# Patient Record
Sex: FEMALE | Race: WHITE | NOT HISPANIC OR LATINO | Employment: FULL TIME | URBAN - METROPOLITAN AREA
[De-identification: names, ages, dates, MRNs, and addresses within clinical notes are randomized per-mention and may not be internally consistent; named-entity substitution may affect disease eponyms.]

---

## 2020-07-08 ENCOUNTER — HOSPITAL ENCOUNTER (OUTPATIENT)
Dept: URGENT CARE | Facility: CLINIC | Age: 35
Discharge: HOME | End: 2020-07-08

## 2020-07-12 LAB — SARS-COV-2 RNA SPEC QL NAA+PROBE: NOT DETECTED

## 2021-01-08 ENCOUNTER — HOSPITAL ENCOUNTER (OUTPATIENT)
Dept: OTHER | Facility: HOSPITAL | Age: 36
Discharge: HOME | End: 2021-01-08
Attending: INTERNAL MEDICINE

## 2021-02-01 ENCOUNTER — HOSPITAL ENCOUNTER (OUTPATIENT)
Dept: VACCINE CLINIC | Facility: HOSPITAL | Age: 36
Discharge: HOME | End: 2021-02-01
Attending: INTERNAL MEDICINE

## 2021-08-15 PROCEDURE — U0003 INFECTIOUS AGENT DETECTION BY NUCLEIC ACID (DNA OR RNA); SEVERE ACUTE RESPIRATORY SYNDROME CORONAVIRUS 2 (SARS-COV-2) (CORONAVIRUS DISEASE [COVID-19]), AMPLIFIED PROBE TECHNIQUE, MAKING USE OF HIGH THROUGHPUT TECHNOLOGIES AS DESCRIBED BY CMS-2020-01-R: HCPCS | Performed by: NURSE PRACTITIONER

## 2021-08-17 ENCOUNTER — TELEPHONE (OUTPATIENT)
Dept: URGENT CARE | Facility: CLINIC | Age: 36
End: 2021-08-17

## 2021-08-17 NOTE — TELEPHONE ENCOUNTER
Called patient and reviewed positive Covid test result.  Patient had seen via My Chart and notified her employer.  Since her symptoms started on 8/15/2021, recommend that she quarantine for 10 days starting on that day.  Patient voiced understanding and no further questions.

## 2023-03-06 PROCEDURE — 87480 CANDIDA DNA DIR PROBE: CPT | Performed by: NURSE PRACTITIONER

## 2023-03-06 PROCEDURE — 87660 TRICHOMONAS VAGIN DIR PROBE: CPT | Performed by: NURSE PRACTITIONER

## 2023-03-06 PROCEDURE — 87491 CHLMYD TRACH DNA AMP PROBE: CPT | Performed by: NURSE PRACTITIONER

## 2023-03-06 PROCEDURE — 87086 URINE CULTURE/COLONY COUNT: CPT | Performed by: NURSE PRACTITIONER

## 2023-03-06 PROCEDURE — 87510 GARDNER VAG DNA DIR PROBE: CPT | Performed by: NURSE PRACTITIONER

## 2023-03-06 PROCEDURE — 87591 N.GONORRHOEAE DNA AMP PROB: CPT | Performed by: NURSE PRACTITIONER

## 2023-03-07 ENCOUNTER — TELEPHONE (OUTPATIENT)
Dept: URGENT CARE | Facility: CLINIC | Age: 38
End: 2023-03-07
Payer: MEDICAID

## 2023-03-08 ENCOUNTER — TELEPHONE (OUTPATIENT)
Dept: URGENT CARE | Facility: CLINIC | Age: 38
End: 2023-03-08
Payer: MEDICAID

## 2023-03-08 NOTE — TELEPHONE ENCOUNTER
----- Message from ERNST Pandey sent at 3/7/2023  3:35 PM EST -----  Please call the patient regarding her abnormal result.    Please inform patient that she tested negative for chlamydia, gonorrhea, trichomonas, and yeast.  Please inform patient that she did test positive for bacterial vaginosis.  I did call and metronidazole to [facility] pharmacy.  She should take this medication twice a day for 7 days as indicated on the prescription label.  While she is taking this medication she needs to avoid all alcohol.  Patient should also continue to take the Bactrim DS that was prescribed at her visit yesterday.

## 2023-03-08 NOTE — TELEPHONE ENCOUNTER
----- Message from ERNST Schwab sent at 3/8/2023 10:27 AM EST -----  Please notify patient of urine culture showing normal urogenital jaylon.  This finding suggests colonization rather than acute UTI.  The patient also tested positive for bacterial vaginosis which could likely cause her symptoms.  The patient can stop the Bactrim at this time and follow-up with PCP or GYN if symptoms worsen or persist.

## 2023-03-09 ENCOUNTER — TELEPHONE (OUTPATIENT)
Dept: URGENT CARE | Facility: CLINIC | Age: 38
End: 2023-03-09
Payer: MEDICAID

## 2023-03-10 ENCOUNTER — TELEPHONE (OUTPATIENT)
Dept: URGENT CARE | Facility: CLINIC | Age: 38
End: 2023-03-10
Payer: MEDICAID

## 2023-07-20 ENCOUNTER — HOSPITAL ENCOUNTER (INPATIENT)
Facility: HOSPITAL | Age: 38
LOS: 3 days | Discharge: LEFT BEFORE DISCHARGE | DRG: 918 | End: 2023-07-23
Attending: EMERGENCY MEDICINE | Admitting: INTERNAL MEDICINE
Payer: MEDICAID

## 2023-07-20 DIAGNOSIS — T39.1X2A INTENTIONAL ACETAMINOPHEN OVERDOSE, INITIAL ENCOUNTER: Primary | ICD-10-CM

## 2023-07-20 LAB
ALBUMIN SERPL-MCNC: 4.7 G/DL (ref 3.5–5.2)
ALBUMIN/GLOB SERPL: 1.6 G/DL
ALP SERPL-CCNC: 65 U/L (ref 39–117)
ALT SERPL W P-5'-P-CCNC: 13 U/L (ref 1–33)
AMPHET+METHAMPHET UR QL: POSITIVE
ANION GAP SERPL CALCULATED.3IONS-SCNC: 22.3 MMOL/L (ref 5–15)
APAP SERPL-MCNC: 15.3 MCG/ML (ref 0–30)
APTT PPP: 25.4 SECONDS (ref 24.2–34.2)
AST SERPL-CCNC: 15 U/L (ref 1–32)
BARBITURATES UR QL SCN: NEGATIVE
BASOPHILS # BLD AUTO: 0.01 10*3/MM3 (ref 0–0.2)
BASOPHILS NFR BLD AUTO: 0.1 % (ref 0–1.5)
BENZODIAZ UR QL SCN: NEGATIVE
BILIRUB SERPL-MCNC: 0.3 MG/DL (ref 0–1.2)
BUN SERPL-MCNC: 20 MG/DL (ref 6–20)
BUN/CREAT SERPL: 15.4 (ref 7–25)
CALCIUM SPEC-SCNC: 9.6 MG/DL (ref 8.6–10.5)
CANNABINOIDS SERPL QL: POSITIVE
CHLORIDE SERPL-SCNC: 97 MMOL/L (ref 98–107)
CO2 SERPL-SCNC: 20.7 MMOL/L (ref 22–29)
COCAINE UR QL: NEGATIVE
CREAT SERPL-MCNC: 1.3 MG/DL (ref 0.57–1)
DEPRECATED RDW RBC AUTO: 39.1 FL (ref 37–54)
EGFRCR SERPLBLD CKD-EPI 2021: 54.8 ML/MIN/1.73
EOSINOPHIL # BLD AUTO: 0 10*3/MM3 (ref 0–0.4)
EOSINOPHIL NFR BLD AUTO: 0 % (ref 0.3–6.2)
ERYTHROCYTE [DISTWIDTH] IN BLOOD BY AUTOMATED COUNT: 12.1 % (ref 12.3–15.4)
ETHANOL BLD-MCNC: <10 MG/DL (ref 0–10)
ETHANOL UR QL: <0.01 %
FENTANYL UR-MCNC: NEGATIVE NG/ML
GLOBULIN UR ELPH-MCNC: 2.9 GM/DL
GLUCOSE SERPL-MCNC: 120 MG/DL (ref 65–99)
HCG INTACT+B SERPL-ACNC: <0.5 MIU/ML
HCT VFR BLD AUTO: 45.5 % (ref 34–46.6)
HGB BLD-MCNC: 15.3 G/DL (ref 12–15.9)
HOLD SPECIMEN: NORMAL
HOLD SPECIMEN: NORMAL
IMM GRANULOCYTES # BLD AUTO: 0.06 10*3/MM3 (ref 0–0.05)
IMM GRANULOCYTES NFR BLD AUTO: 0.4 % (ref 0–0.5)
INR PPP: 1.22 (ref 0.86–1.15)
LYMPHOCYTES # BLD AUTO: 1.48 10*3/MM3 (ref 0.7–3.1)
LYMPHOCYTES NFR BLD AUTO: 9.4 % (ref 19.6–45.3)
MCH RBC QN AUTO: 29.6 PG (ref 26.6–33)
MCHC RBC AUTO-ENTMCNC: 33.6 G/DL (ref 31.5–35.7)
MCV RBC AUTO: 88 FL (ref 79–97)
METHADONE UR QL SCN: NEGATIVE
MONOCYTES # BLD AUTO: 1.01 10*3/MM3 (ref 0.1–0.9)
MONOCYTES NFR BLD AUTO: 6.4 % (ref 5–12)
NEUTROPHILS NFR BLD AUTO: 13.2 10*3/MM3 (ref 1.7–7)
NEUTROPHILS NFR BLD AUTO: 83.7 % (ref 42.7–76)
NRBC BLD AUTO-RTO: 0 /100 WBC (ref 0–0.2)
OPIATES UR QL: NEGATIVE
OXYCODONE UR QL SCN: NEGATIVE
PLATELET # BLD AUTO: 406 10*3/MM3 (ref 140–450)
PMV BLD AUTO: 9.3 FL (ref 6–12)
POTASSIUM SERPL-SCNC: 3.4 MMOL/L (ref 3.5–5.2)
PROT SERPL-MCNC: 7.6 G/DL (ref 6–8.5)
PROTHROMBIN TIME: 15.5 SECONDS (ref 11.8–14.9)
RBC # BLD AUTO: 5.17 10*6/MM3 (ref 3.77–5.28)
SALICYLATES SERPL-MCNC: <0.3 MG/DL
SODIUM SERPL-SCNC: 140 MMOL/L (ref 136–145)
WBC NRBC COR # BLD: 15.76 10*3/MM3 (ref 3.4–10.8)
WHOLE BLOOD HOLD COAG: NORMAL
WHOLE BLOOD HOLD SPECIMEN: NORMAL

## 2023-07-20 PROCEDURE — 0 ACETYLCYSTEINE PER 100 MG: Performed by: EMERGENCY MEDICINE

## 2023-07-20 PROCEDURE — 80307 DRUG TEST PRSMV CHEM ANLYZR: CPT

## 2023-07-20 PROCEDURE — 93010 ELECTROCARDIOGRAM REPORT: CPT | Performed by: INTERNAL MEDICINE

## 2023-07-20 PROCEDURE — 85610 PROTHROMBIN TIME: CPT

## 2023-07-20 PROCEDURE — 84702 CHORIONIC GONADOTROPIN TEST: CPT | Performed by: EMERGENCY MEDICINE

## 2023-07-20 PROCEDURE — 93005 ELECTROCARDIOGRAM TRACING: CPT | Performed by: EMERGENCY MEDICINE

## 2023-07-20 PROCEDURE — 80143 DRUG ASSAY ACETAMINOPHEN: CPT

## 2023-07-20 PROCEDURE — 85025 COMPLETE CBC W/AUTO DIFF WBC: CPT

## 2023-07-20 PROCEDURE — 99285 EMERGENCY DEPT VISIT HI MDM: CPT

## 2023-07-20 PROCEDURE — 82077 ASSAY SPEC XCP UR&BREATH IA: CPT

## 2023-07-20 PROCEDURE — 80053 COMPREHEN METABOLIC PANEL: CPT

## 2023-07-20 PROCEDURE — 93005 ELECTROCARDIOGRAM TRACING: CPT

## 2023-07-20 PROCEDURE — 80179 DRUG ASSAY SALICYLATE: CPT

## 2023-07-20 PROCEDURE — 85730 THROMBOPLASTIN TIME PARTIAL: CPT | Performed by: EMERGENCY MEDICINE

## 2023-07-20 PROCEDURE — 36415 COLL VENOUS BLD VENIPUNCTURE: CPT

## 2023-07-20 RX ORDER — SODIUM CHLORIDE 0.9 % (FLUSH) 0.9 %
10 SYRINGE (ML) INJECTION AS NEEDED
Status: DISCONTINUED | OUTPATIENT
Start: 2023-07-20 | End: 2023-07-23 | Stop reason: HOSPADM

## 2023-07-20 RX ADMIN — ACETYLCYSTEINE 10240 MG: 6 INJECTION, SOLUTION INTRAVENOUS at 23:43

## 2023-07-21 PROBLEM — T50.901D: Status: ACTIVE | Noted: 2023-07-21

## 2023-07-21 LAB
ALBUMIN SERPL-MCNC: 4.7 G/DL (ref 3.5–5.2)
ALBUMIN/GLOB SERPL: 1.7 G/DL
ALP SERPL-CCNC: 66 U/L (ref 39–117)
ALT SERPL W P-5'-P-CCNC: 16 U/L (ref 1–33)
ALT SERPL W P-5'-P-CCNC: 16 U/L (ref 1–33)
ANION GAP SERPL CALCULATED.3IONS-SCNC: 13.9 MMOL/L (ref 5–15)
APAP SERPL-MCNC: <5 MCG/ML (ref 0–30)
AST SERPL-CCNC: 16 U/L (ref 1–32)
AST SERPL-CCNC: 16 U/L (ref 1–32)
BASOPHILS # BLD AUTO: 0.03 10*3/MM3 (ref 0–0.2)
BASOPHILS NFR BLD AUTO: 0.3 % (ref 0–1.5)
BILIRUB SERPL-MCNC: 0.2 MG/DL (ref 0–1.2)
BUN SERPL-MCNC: 19 MG/DL (ref 6–20)
BUN/CREAT SERPL: 19 (ref 7–25)
CALCIUM SPEC-SCNC: 10.3 MG/DL (ref 8.6–10.5)
CHLORIDE SERPL-SCNC: 97 MMOL/L (ref 98–107)
CO2 SERPL-SCNC: 30.1 MMOL/L (ref 22–29)
CREAT SERPL-MCNC: 1 MG/DL (ref 0.57–1)
DEPRECATED RDW RBC AUTO: 38.5 FL (ref 37–54)
EGFRCR SERPLBLD CKD-EPI 2021: 75 ML/MIN/1.73
EOSINOPHIL # BLD AUTO: 0.07 10*3/MM3 (ref 0–0.4)
EOSINOPHIL NFR BLD AUTO: 0.6 % (ref 0.3–6.2)
ERYTHROCYTE [DISTWIDTH] IN BLOOD BY AUTOMATED COUNT: 12.4 % (ref 12.3–15.4)
GLOBULIN UR ELPH-MCNC: 2.8 GM/DL
GLUCOSE SERPL-MCNC: 110 MG/DL (ref 65–99)
HCT VFR BLD AUTO: 45.5 % (ref 34–46.6)
HGB BLD-MCNC: 15.4 G/DL (ref 12–15.9)
IMM GRANULOCYTES # BLD AUTO: 0.02 10*3/MM3 (ref 0–0.05)
IMM GRANULOCYTES NFR BLD AUTO: 0.2 % (ref 0–0.5)
INR PPP: 1.13 (ref 0.86–1.15)
LYMPHOCYTES # BLD AUTO: 2.77 10*3/MM3 (ref 0.7–3.1)
LYMPHOCYTES NFR BLD AUTO: 23.9 % (ref 19.6–45.3)
MCH RBC QN AUTO: 28.8 PG (ref 26.6–33)
MCHC RBC AUTO-ENTMCNC: 33.8 G/DL (ref 31.5–35.7)
MCV RBC AUTO: 85.2 FL (ref 79–97)
MONOCYTES # BLD AUTO: 0.76 10*3/MM3 (ref 0.1–0.9)
MONOCYTES NFR BLD AUTO: 6.6 % (ref 5–12)
NEUTROPHILS NFR BLD AUTO: 68.4 % (ref 42.7–76)
NEUTROPHILS NFR BLD AUTO: 7.92 10*3/MM3 (ref 1.7–7)
NRBC BLD AUTO-RTO: 0 /100 WBC (ref 0–0.2)
PLATELET # BLD AUTO: 407 10*3/MM3 (ref 140–450)
PMV BLD AUTO: 9.5 FL (ref 6–12)
POTASSIUM SERPL-SCNC: 2.9 MMOL/L (ref 3.5–5.2)
PROT SERPL-MCNC: 7.5 G/DL (ref 6–8.5)
PROTHROMBIN TIME: 14.6 SECONDS (ref 11.8–14.9)
QT INTERVAL: 419 MS
RBC # BLD AUTO: 5.34 10*6/MM3 (ref 3.77–5.28)
SODIUM SERPL-SCNC: 141 MMOL/L (ref 136–145)
WBC NRBC COR # BLD: 11.57 10*3/MM3 (ref 3.4–10.8)

## 2023-07-21 PROCEDURE — 0 ACETYLCYSTEINE PER 100 MG: Performed by: EMERGENCY MEDICINE

## 2023-07-21 PROCEDURE — 85025 COMPLETE CBC W/AUTO DIFF WBC: CPT | Performed by: INTERNAL MEDICINE

## 2023-07-21 PROCEDURE — 80143 DRUG ASSAY ACETAMINOPHEN: CPT | Performed by: INTERNAL MEDICINE

## 2023-07-21 PROCEDURE — 85610 PROTHROMBIN TIME: CPT | Performed by: INTERNAL MEDICINE

## 2023-07-21 PROCEDURE — 80053 COMPREHEN METABOLIC PANEL: CPT | Performed by: INTERNAL MEDICINE

## 2023-07-21 RX ADMIN — ACETYLCYSTEINE 3420 MG: 6 INJECTION, SOLUTION INTRAVENOUS at 01:47

## 2023-07-21 RX ADMIN — ACETYLCYSTEINE 6820 MG: 6 INJECTION, SOLUTION INTRAVENOUS at 06:00

## 2023-07-21 NOTE — ED PROVIDER NOTES
Time: 11:02 PM EDT  Date of encounter:  7/20/2023  Independent Historian/Clinical History and Information was obtained by:   Patient    History is limited by: N/A    Chief Complaint: overdose      History of Present Illness:  Patient is a 38 y.o. year old female who presents to the emergency department for evaluation of overdose.  Patient reports that she took 2 bottles of Tylenol, 2 bottles of ibuprofen, 1 box of Tylenol Sinus.  This happened last night around 9 PM >24 hours ago.  She reports things have just been building up which caused her to do this.  She reports nausea and vomiting.  She states she had abdominal pain earlier that has since improved.    HPI    Patient Care Team  Primary Care Provider: Nelia Garcia PA-C    Past Medical History:     No Known Allergies  Past Medical History:   Diagnosis Date    Anxiety and depression     Tick bite     LEFT SIDE AT BRA LINE     Past Surgical History:   Procedure Laterality Date    SUPRAPUBIC TUBE REMOVAL       History reviewed. No pertinent family history.    Home Medications:  Prior to Admission medications    Medication Sig Start Date End Date Taking? Authorizing Provider   cetirizine (zyrTEC) 10 MG tablet Take 1 tablet by mouth Daily.    Emergency, Nurse Oh, RN   hydrOXYzine (ATARAX) 25 MG tablet  2/15/19   Emergency, Nurse Oh, RN        Social History:   Social History     Tobacco Use    Smoking status: Every Day     Packs/day: 1.00     Years: 25.00     Pack years: 25.00     Types: Cigarettes    Smokeless tobacco: Never   Vaping Use    Vaping Use: Never used   Substance Use Topics    Alcohol use: Yes     Comment: RARELY    Drug use: Defer         Review of Systems:  Review of Systems   Constitutional:  Negative for chills and fever.   HENT:  Negative for congestion, ear pain and sore throat.    Eyes:  Negative for pain.   Respiratory:  Negative for cough, chest tightness and shortness of breath.    Cardiovascular:  Negative for chest pain.  "  Gastrointestinal:  Positive for abdominal pain, nausea and vomiting. Negative for diarrhea.   Genitourinary:  Negative for flank pain and hematuria.   Musculoskeletal:  Negative for joint swelling.   Skin:  Negative for pallor.   Neurological:  Negative for seizures and headaches.   All other systems reviewed and are negative.     Physical Exam:  /93 (BP Location: Right arm, Patient Position: Sitting)   Pulse 61   Temp 98.2 °F (36.8 °C) (Oral)   Resp 20   Ht 175.3 cm (69\")   Wt 68.2 kg (150 lb 5.7 oz)   LMP 07/17/2023   SpO2 99%   BMI 22.20 kg/m²     Physical Exam  Constitutional:       Appearance: Normal appearance.   HENT:      Head: Normocephalic and atraumatic.      Nose: Nose normal.      Mouth/Throat:      Mouth: Mucous membranes are moist.   Eyes:      Extraocular Movements: Extraocular movements intact.      Conjunctiva/sclera: Conjunctivae normal.      Pupils: Pupils are equal, round, and reactive to light.   Cardiovascular:      Rate and Rhythm: Normal rate and regular rhythm.      Pulses: Normal pulses.      Heart sounds: Normal heart sounds.   Pulmonary:      Effort: Pulmonary effort is normal.      Breath sounds: Normal breath sounds.   Abdominal:      General: There is no distension.      Palpations: Abdomen is soft.      Tenderness: There is no abdominal tenderness.   Musculoskeletal:         General: Normal range of motion.      Cervical back: Normal range of motion.   Skin:     General: Skin is warm and dry.      Capillary Refill: Capillary refill takes less than 2 seconds.   Neurological:      General: No focal deficit present.      Mental Status: She is alert and oriented to person, place, and time. Mental status is at baseline.   Psychiatric:         Mood and Affect: Mood normal.         Behavior: Behavior normal.                Procedures:  Procedures      Medical Decision Making:      Comorbidities that affect care:    Anxiety, depression    External Notes reviewed:    Previous " Clinic Note: Patient seen at urgent care on 3/6/2023 for UTI      The following orders were placed and all results were independently analyzed by me:  Orders Placed This Encounter   Procedures    [place] Draw    Comprehensive Metabolic Panel    Acetaminophen Level    Ethanol    Salicylate Level    Urine Drug Screen - Urine, Clean Catch    CBC Auto Differential    Protime-INR    aPTT    hCG, Quantitative, Pregnancy    Acetaminophen Level    ALT    AST    NPO Diet NPO Type: Strict NPO    Continuous Pulse Oximetry    Vital Signs    Undress & Gown    RN To Release Lab Order for Acetaminophen Level, AST & ALT After Acetylcysteine Administration Complete    Call Results of Acetaminophen Level, ALT & AST to Provider    Psych / Access to See    Inpatient Hospitalist Consult    Oxygen Therapy- Nasal Cannula; Titrate 1-6 LPM Per SpO2; 90 - 95%    POC Glucose Once    ECG 12 Lead Drug Monitoring    Insert Peripheral IV    Suicide Precautions    CBC & Differential    Green Top (Gel)    Lavender Top    Gold Top - SST    Light Blue Top       Medications Given in the Emergency Department:  Medications   sodium chloride 0.9 % flush 10 mL (has no administration in time range)   acetylcysteine (ACETADOTE) 10,240 mg in dextrose (D5W) 5 % 200 mL infusion (has no administration in time range)     Followed by   acetylcysteine (ACETADOTE) 3,420 mg in dextrose (D5W) 5 % 500 mL infusion (has no administration in time range)     Followed by   acetylcysteine (ACETADOTE) 6,820 mg in dextrose (D5W) 5 % 1,000 mL infusion (has no administration in time range)        ED Course:    ED Course as of 07/20/23 2308   Thu Jul 20, 2023   2206 ECG 12 Lead Drug Monitoring  Sinus rhythm rate of 61.  No acute ST elevation.  Normal VA and QTc interval.  EKG interpreted by me. [LD]      ED Course User Index  [LD] Lindy De Guzman MD       Labs:    Lab Results (last 24 hours)       Procedure Component Value Units Date/Time    CBC & Differential [[id number]]   (Abnormal) Collected: 07/20/23 2113    Specimen: Blood Updated: 07/20/23 2147    Narrative:      The following orders were created for panel order CBC & Differential.  Procedure                               Abnormality         Status                     ---------                               -----------         ------                     CBC Auto Differential[[id number]]        Abnormal            Final result                 Please view results for these tests on the individual orders.    Comprehensive Metabolic Panel [[id number]]  (Abnormal) Collected: 07/20/23 2113    Specimen: Blood Updated: 07/20/23 2212     Glucose 120 mg/dL      BUN 20 mg/dL      Creatinine 1.30 mg/dL      Sodium 140 mmol/L      Potassium 3.4 mmol/L      Chloride 97 mmol/L      CO2 20.7 mmol/L      Calcium 9.6 mg/dL      Total Protein 7.6 g/dL      Albumin 4.7 g/dL      ALT (SGPT) 13 U/L      AST (SGOT) 15 U/L      Alkaline Phosphatase 65 U/L      Total Bilirubin 0.3 mg/dL      Globulin 2.9 gm/dL      A/G Ratio 1.6 g/dL      BUN/Creatinine Ratio 15.4     Anion Gap 22.3 mmol/L      eGFR 54.8 mL/min/1.73     Narrative:      GFR Normal >60  Chronic Kidney Disease <60  Kidney Failure <15      Acetaminophen Level [[id number]]  (Normal) Collected: 07/20/23 2113    Specimen: Blood Updated: 07/20/23 2212     Acetaminophen 15.3 mcg/mL     Ethanol [[id number]] Collected: 07/20/23 2113    Specimen: Blood Updated: 07/20/23 2212     Ethanol <10 mg/dL      Ethanol % <0.010 %     Narrative:      Ethanol (Plasma)  <10 Essentially Negative    Toxic Concentrations           mg/dL    Flushing, slowing of reflexes    Impaired visual activity         Depression of CNS              >100  Possible Coma                  >300       Salicylate Level [[id number]]  (Normal) Collected: 07/20/23 2113    Specimen: Blood Updated: 07/20/23 2212     Salicylate <0.3 mg/dL     CBC Auto Differential [[id number]]  (Abnormal) Collected: 07/20/23 2113    Specimen: Blood  Updated: 07/20/23 2147     WBC 15.76 10*3/mm3      RBC 5.17 10*6/mm3      Hemoglobin 15.3 g/dL      Hematocrit 45.5 %      MCV 88.0 fL      MCH 29.6 pg      MCHC 33.6 g/dL      RDW 12.1 %      RDW-SD 39.1 fl      MPV 9.3 fL      Platelets 406 10*3/mm3      Neutrophil % 83.7 %      Lymphocyte % 9.4 %      Monocyte % 6.4 %      Eosinophil % 0.0 %      Basophil % 0.1 %      Immature Grans % 0.4 %      Neutrophils, Absolute 13.20 10*3/mm3      Lymphocytes, Absolute 1.48 10*3/mm3      Monocytes, Absolute 1.01 10*3/mm3      Eosinophils, Absolute 0.00 10*3/mm3      Basophils, Absolute 0.01 10*3/mm3      Immature Grans, Absolute 0.06 10*3/mm3      nRBC 0.0 /100 WBC     Protime-INR [[id number]]  (Abnormal) Collected: 07/20/23 2113    Specimen: Blood Updated: 07/20/23 2155     Protime 15.5 Seconds      INR 1.22    Narrative:      Suggested Therapeutic Ranges For Oral Anticoagulant Therapy:  Level of Therapy                      INR Target Range  Standard Dose                            2.0-3.0  High Dose                                2.5-3.5  Patients not receiving anticoagulant  Therapy Normal Range                     0.86-1.15    aPTT [[id number]]  (Normal) Collected: 07/20/23 2113    Specimen: Blood Updated: 07/20/23 2204     PTT 25.4 seconds     hCG, Quantitative, Pregnancy [[id number]] Collected: 07/20/23 2113    Specimen: Blood Updated: 07/20/23 2213     HCG Quantitative <0.50 mIU/mL     Narrative:      HCG Ranges by Gestational Age    Females - non-pregnant premenopausal   </= 1mIU/mL HCG  Females - postmenopausal               </= 7mIU/mL HCG    3 Weeks       5.4   -      72 mIU/mL  4 Weeks      10.2   -     708 mIU/mL  5 Weeks       217   -   8,245 mIU/mL  6 Weeks       152   -  32,177 mIU/mL  7 Weeks     4,059   - 153,767 mIU/mL  8 Weeks    31,366   - 149,094 mIU/mL  9 Weeks    59,109   - 135,901 mIU/mL  10 Weeks   44,186   - 170,409 mIU/mL  12 Weeks   27,107   - 201,615 mIU/mL  14 Weeks   24,302   -  93,646  mIU/mL  15 Weeks   12,540   -  69,747 mIU/mL  16 Weeks    8,904   -  55,332 mIU/mL  17 Weeks    8,240   -  51,793 mIU/mL  18 Weeks    9,649   -  55,271 mIU/mL      Urine Drug Screen - Urine, Clean Catch [[id number]] Collected: 07/20/23 2255    Specimen: Urine, Clean Catch Updated: 07/20/23 2257             Imaging:    No Radiology Exams Resulted Within Past 24 Hours      Differential Diagnosis and Discussion:    Psychiatric: Differential diagnosis includes but is not limited to depression, psychosis, bipolar disorder, anxiety, manic episode, schizophrenia, and substance abuse.    All labs were reviewed and interpreted by me.  EKG was interpreted by me.    MDM  Number of Diagnoses or Management Options  Intentional acetaminophen overdose, initial encounter  Diagnosis management comments: Patient is afebrile nontoxic-appearing.  Vitals remarkable for tachycardia.  Heart rate improved.  Tylenol level was 15.3.  INR elevated to 1.2.  PTT within normal range.  LFTs are not elevated.  Patient was discussed with poison control who states anything over 17 hours greater than 15.3 is considered toxic patient will will need to be treated with NAC.  She will need a redraw of INR, LFTs, Tylenol level 2 hours before completion of last dose of NAC if this improves she can be medically cleared.  I discussed patient with hospitalist and she will admit for further care.       Amount and/or Complexity of Data Reviewed  Clinical lab tests: reviewed  Tests in the radiology section of CPT®: reviewed  Review and summarize past medical records: yes  Independent visualization of images, tracings, or specimens: yes    Risk of Complications, Morbidity, and/or Mortality  Presenting problems: moderate  Management options: moderate         Critical Care Note: Total Critical Care time of 45 minutes. Total critical care time documented does not include time spent on separately billed procedures for services of nurses or physician assistants. I  personally saw and examined the patient. I have reviewed all diagnostic interpretations and treatment plans as written. I was present for the key portions of any procedures performed and the inclusive time noted in any critical care statement. Critical care time includes patient management by me, time spent at the patients bedside,  time to review lab and imaging results, discussing patient care, documentation in the medical record, and time spent with family or caregiver.    Patient Care Considerations:          Consultants/Shared Management Plan:    Hospitalist: I have discussed the case with Dr Morrissey who agrees to accept the patient for admission.    Social Determinants of Health:    Patient is independent, reliable, and has access to care.       Disposition and Care Coordination:    Admit:   Through independent evaluation of the patient's history, physical, and imperical data, the patient meets criteria for observation/admission to the hospital.        Final diagnoses:   Intentional acetaminophen overdose, initial encounter        ED Disposition       ED Disposition   Decision to Admit    Condition   --    Comment   --               This medical record created using voice recognition software.             Lindy De Guzman MD  07/20/23 1015       Lindy De Guzman MD  07/20/23 9381

## 2023-07-21 NOTE — PLAN OF CARE
Goal Outcome Evaluation:  Plan of Care Reviewed With: patient        Progress: no change  Outcome Evaluation: patient is alert and oriented this shift. no c/o pain. patient remains in close watch but states she has no suicidal ideations at this time. acetylcysteine drip currently infusing. no other changes at this time.

## 2023-07-21 NOTE — H&P
[facility]   HISTORY AND PHYSICAL    Patient Name: Concha Davison  : 1985  MRN: [id number]  Primary Care Physician:  Nelia Garcia PA-C  Date of admission: 2023    Subjective   Subjective     Chief Complaint: Patient was brought to the emergency room when she took a bottle of Tylenol yesterday with suicidal intent probably and has been started on protocol for clearance of Tylenol after discussion with the poison control    HPI: Patient admitted for suicidal intent admission for Tylenol toxicity    Concha Davison is a 38 y.o. female patient with depression and suicidal intent    Review of Systems   All systems were reviewed and negative except for: Reviewed    Personal History     Past Medical History:   Diagnosis Date   • Anxiety and depression    • Bradycardia    • Hyperlipidemia    • Hyperthyroidism    • Tick bite     LEFT SIDE AT BRA LINE   • Vitamin D deficiency        Past Surgical History:   Procedure Laterality Date   • SALPINGECTOMY Bilateral    • SUPRAPUBIC TUBE REMOVAL         Family History: family history is not on file. Otherwise pertinent FHx was reviewed and not pertinent to current issue.    Social History:  reports that she has been smoking cigarettes. She has a 25.00 pack-year smoking history. She has never used smokeless tobacco. She reports current alcohol use. Drug use questions deferred to the physician.    Home Medications:         Allergies:  No Known Allergies    Objective   Objective     Vitals:   Temp:  [98 °F (36.7 °C)-98.5 °F (36.9 °C)] 98.5 °F (36.9 °C)  Heart Rate:  [] 75  Resp:  [18-20] 18  BP: (108-133)/(51-93) 108/51  Physical Exam    Constitutional: Alert oriented not in acute distress   Eyes: Pupils equal reacting to light and accommodation   HENT: Normal   Neck: No palpable adenopathy   Respiratory: No rales or rhonchi   Cardiovascular: S1-S2 normal no S3 or S4   Gastrointestinal: Normal   Musculoskeletal: Normal   Neurologic: No localizing  sign  Skin: No rash    Result Review    Result Review:  I have personally reviewed the results from the time of this admission to 7/21/2023 11:21 EDT and agree with these findings:  []  Laboratory  []  Microbiology  []  Radiology  []  EKG/Telemetry   []  Cardiology/Vascular   []  Pathology  []  Old records  []  Other:  Most notable findings include: Reviewed    Assessment & Plan   Assessment / Plan     Brief Patient Summary:  Concha Davison is a 38 y.o. female who patient admitted because of Tylenol toxicity    Active Hospital Problems:  Active Hospital Problems    Diagnosis    • **Tylenol overdose, intentional self-harm, initial encounter    • Drug overdose, accidental or unintentional, subsequent encounter        Plan:   Protocol for Tylenol clearance    DVT prophylaxis:  No DVT prophylaxis order currently exists.    CODE STATUS:    Level Of Support Discussed With: Patient  Code Status (Patient has no pulse and is not breathing): CPR (Attempt to Resuscitate)  Medical Interventions (Patient has pulse or is breathing): Full Support  Release to patient: Routine Release      Admission Status:  I believe this patient meets inpatient status.    Electronically signed by Donis Morrissey MD, 07/21/23, 11:21 AM EDT.      Part of this note may be an electronic transcription/translation of spoken language to printed text using the Dragon Dictation System.

## 2023-07-21 NOTE — PLAN OF CARE
Goal Outcome Evaluation:         Pt pleasant and resting, HR elevates to 140-150s. All other VSS. Sitter at bedside, suicide precautions remain in place per facility protocol. Will continue to monitor.

## 2023-07-21 NOTE — ED NOTES
Taylor from Poison Control states pt needs to have Tylenol reversal agent due to Tylenol level being 15.3.      Pc also states these labs need to be redrawn 2 hours before last bag ends: CMP, Coags, and Tylenol.

## 2023-07-21 NOTE — ED NOTES
Contacted posion control, spoke to Taylor, covered and put in all orders she recommended. If tylenol level comes back and shows tylenol in system, P.C recommends giving the IV antidote. And treat any elevated liver enzymes.

## 2023-07-21 NOTE — NURSING NOTE
Talked to Sanjana with infection control. Labs ordered per recommendation of the Poison Control 1-2 hours prior to end of acetylcysteine infusion.

## 2023-07-22 LAB
ALBUMIN SERPL-MCNC: 4.1 G/DL (ref 3.5–5.2)
ALBUMIN/GLOB SERPL: 1.7 G/DL
ALP SERPL-CCNC: 53 U/L (ref 39–117)
ALT SERPL W P-5'-P-CCNC: 17 U/L (ref 1–33)
ANION GAP SERPL CALCULATED.3IONS-SCNC: 9.7 MMOL/L (ref 5–15)
AST SERPL-CCNC: 17 U/L (ref 1–32)
BASOPHILS # BLD AUTO: 0.03 10*3/MM3 (ref 0–0.2)
BASOPHILS NFR BLD AUTO: 0.3 % (ref 0–1.5)
BILIRUB SERPL-MCNC: 0.2 MG/DL (ref 0–1.2)
BUN SERPL-MCNC: 15 MG/DL (ref 6–20)
BUN/CREAT SERPL: 19 (ref 7–25)
CALCIUM SPEC-SCNC: 9.4 MG/DL (ref 8.6–10.5)
CHLORIDE SERPL-SCNC: 99 MMOL/L (ref 98–107)
CO2 SERPL-SCNC: 29.3 MMOL/L (ref 22–29)
CREAT SERPL-MCNC: 0.79 MG/DL (ref 0.57–1)
DEPRECATED RDW RBC AUTO: 40.3 FL (ref 37–54)
EGFRCR SERPLBLD CKD-EPI 2021: 99.6 ML/MIN/1.73
EOSINOPHIL # BLD AUTO: 0.08 10*3/MM3 (ref 0–0.4)
EOSINOPHIL NFR BLD AUTO: 0.9 % (ref 0.3–6.2)
ERYTHROCYTE [DISTWIDTH] IN BLOOD BY AUTOMATED COUNT: 12.5 % (ref 12.3–15.4)
GLOBULIN UR ELPH-MCNC: 2.4 GM/DL
GLUCOSE SERPL-MCNC: 121 MG/DL (ref 65–99)
HCT VFR BLD AUTO: 43.4 % (ref 34–46.6)
HGB BLD-MCNC: 14.4 G/DL (ref 12–15.9)
IMM GRANULOCYTES # BLD AUTO: 0.02 10*3/MM3 (ref 0–0.05)
IMM GRANULOCYTES NFR BLD AUTO: 0.2 % (ref 0–0.5)
LYMPHOCYTES # BLD AUTO: 2.96 10*3/MM3 (ref 0.7–3.1)
LYMPHOCYTES NFR BLD AUTO: 33.9 % (ref 19.6–45.3)
MCH RBC QN AUTO: 29.3 PG (ref 26.6–33)
MCHC RBC AUTO-ENTMCNC: 33.2 G/DL (ref 31.5–35.7)
MCV RBC AUTO: 88.4 FL (ref 79–97)
MONOCYTES # BLD AUTO: 0.74 10*3/MM3 (ref 0.1–0.9)
MONOCYTES NFR BLD AUTO: 8.5 % (ref 5–12)
NEUTROPHILS NFR BLD AUTO: 4.9 10*3/MM3 (ref 1.7–7)
NEUTROPHILS NFR BLD AUTO: 56.2 % (ref 42.7–76)
NRBC BLD AUTO-RTO: 0 /100 WBC (ref 0–0.2)
PLATELET # BLD AUTO: 360 10*3/MM3 (ref 140–450)
PMV BLD AUTO: 9.6 FL (ref 6–12)
POTASSIUM SERPL-SCNC: 3.5 MMOL/L (ref 3.5–5.2)
PROT SERPL-MCNC: 6.5 G/DL (ref 6–8.5)
RBC # BLD AUTO: 4.91 10*6/MM3 (ref 3.77–5.28)
SODIUM SERPL-SCNC: 138 MMOL/L (ref 136–145)
WBC NRBC COR # BLD: 8.73 10*3/MM3 (ref 3.4–10.8)

## 2023-07-22 PROCEDURE — 80053 COMPREHEN METABOLIC PANEL: CPT | Performed by: INTERNAL MEDICINE

## 2023-07-22 PROCEDURE — 85025 COMPLETE CBC W/AUTO DIFF WBC: CPT | Performed by: INTERNAL MEDICINE

## 2023-07-22 RX ORDER — BISACODYL 5 MG/1
5 TABLET, DELAYED RELEASE ORAL DAILY PRN
Status: DISCONTINUED | OUTPATIENT
Start: 2023-07-22 | End: 2023-07-23 | Stop reason: HOSPADM

## 2023-07-22 RX ORDER — BISACODYL 10 MG
10 SUPPOSITORY, RECTAL RECTAL DAILY PRN
Status: DISCONTINUED | OUTPATIENT
Start: 2023-07-22 | End: 2023-07-23 | Stop reason: HOSPADM

## 2023-07-22 RX ORDER — POLYETHYLENE GLYCOL 3350 17 G/17G
17 POWDER, FOR SOLUTION ORAL DAILY PRN
Status: DISCONTINUED | OUTPATIENT
Start: 2023-07-22 | End: 2023-07-23 | Stop reason: HOSPADM

## 2023-07-22 RX ADMIN — NICOTINE 1 PATCH: 7 PATCH TRANSDERMAL at 11:42

## 2023-07-22 NOTE — NURSING NOTE
Poison control updated on pt status and lab work. Satisfied with patient status at this time and will dc from caseload.
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
## 2023-07-22 NOTE — PLAN OF CARE
Goal Outcome Evaluation:      Pt is A&O x4. Rested most of the night. No complaints during shift. Poison control called and pts lab results were relayed to them. Poison control requested lab investigations should be repeated in the morning. Continue care plan.

## 2023-07-22 NOTE — PROGRESS NOTES
[facility]     Progress Note    Patient Name: Concha Davison  : 1985  MRN: [id number]  Primary Care Physician:  Nelia Garcia PA-C  Date of admission: 2023    Subjective   Subjective     Chief Complaint: Patient more stable we will remove the IV have discussed with the psychiatry and they feel that patient needs to be in the unit for a while to get her more psych help she is ambivalent regarding the suicidal thoughts as yet    HPI: Patient with depression suicidal intent admission to the hospital with Tylenol toxicity which has resolved    Review of Systems   All systems were reviewed and negative except for: Reviewed    Objective   Objective     Vitals:   Temp:  [97.9 °F (36.6 °C)-98.9 °F (37.2 °C)] 97.9 °F (36.6 °C)  Heart Rate:  [64-99] 85  Resp:  [18] 18  BP: (103-127)/(56-77) 118/72    Physical Exam    Constitutional: Awake, alert   Eyes: PERRLA, sclerae anicteric, no conjunctival injection   HENT: NCAT, mucous membranes moist   Neck: Supple, no thyromegaly, no lymphadenopathy, trachea midline   Respiratory: Clear to auscultation bilaterally, nonlabored respirations    Cardiovascular: RRR, no murmurs, rubs, or gallops, palpable pedal pulses bilaterally   Gastrointestinal: Positive bowel sounds, soft, nontender, nondistended   Musculoskeletal: No bilateral ankle edema, no clubbing or cyanosis to extremities   Psychiatric: Appropriate affect, cooperative   Neurologic: Oriented x 3, strength symmetric in all extremities, Cranial Nerves grossly intact to confrontation, speech clear   Skin: No rashes   No change  Result Review    Result Review:  I have personally reviewed the results from the time of this admission to 2023 11:50 EDT and agree with these findings:  []  Laboratory  []  Microbiology  []  Radiology  []  EKG/Telemetry   []  Cardiology/Vascular   []  Pathology  []  Old records  []  Other:  Most notable findings include: Patient with suicidal Tylenol toxicity    Assessment &  Plan   Assessment / Plan     Brief Patient Summary:  Concha Davison is a 38 y.o. female who with Tylenol toxicity depression    Active Hospital Problems:  Active Hospital Problems    Diagnosis     **Tylenol overdose, intentional self-harm, initial encounter     Drug overdose, accidental or unintentional, subsequent encounter        Plan:   Psych consult possible transfer [facility]    DVT prophylaxis:  No DVT prophylaxis order currently exists.    CODE STATUS:   Level Of Support Discussed With: Patient  Code Status (Patient has no pulse and is not breathing): CPR (Attempt to Resuscitate)  Medical Interventions (Patient has pulse or is breathing): Full Support  Release to patient: Routine Release    Disposition:  I expect patient to be discharged to be transferred to [facility] unit when the bed is available.    Electronically signed by Donis Morrissey MD, 07/22/23, 11:50 AM EDT.      Part of this note may be an electronic transcription/translation of spoken language to printed text using the Dragon Dictation System.

## 2023-07-22 NOTE — PLAN OF CARE
Goal Outcome Evaluation:  Plan of Care Reviewed With: patient        Progress: no change  Outcome Evaluation: patient is alert and oriented this shift, no c/o pain. patient remains in close watch. cleared from poison control. no other changes at this time.

## 2023-07-23 VITALS
HEART RATE: 90 BPM | HEIGHT: 69 IN | DIASTOLIC BLOOD PRESSURE: 73 MMHG | SYSTOLIC BLOOD PRESSURE: 119 MMHG | OXYGEN SATURATION: 98 % | BODY MASS INDEX: 22.27 KG/M2 | RESPIRATION RATE: 14 BRPM | WEIGHT: 150.35 LBS | TEMPERATURE: 98.1 F

## 2023-07-23 RX ORDER — FLUOXETINE HYDROCHLORIDE 20 MG/1
20 CAPSULE ORAL DAILY
Status: DISCONTINUED | OUTPATIENT
Start: 2023-07-23 | End: 2023-07-23

## 2023-07-23 RX ORDER — ESCITALOPRAM OXALATE 10 MG/1
10 TABLET ORAL DAILY
Status: DISCONTINUED | OUTPATIENT
Start: 2023-07-23 | End: 2023-07-23 | Stop reason: HOSPADM

## 2023-07-23 RX ADMIN — NICOTINE 1 PATCH: 7 PATCH TRANSDERMAL at 08:36

## 2023-07-23 NOTE — CONSULTS
"   LOS: 3 days   Patient Care Team:  Nelia Garcia PA-C as PCP - General (Physician Assistant)    Chief Complaint:  Depression    Subjective     Patient acknowledges recent stressors, depressive symptoms, and serious suicide attempt leading to hospitalization.She requests discharge. Patient cannot identify what has necessarily changed about her situation and acknowledges \"I don't necessarily care if I'm alive or not.\" She appears to still have some ambivalence about having survived her suicide attempt. She does express a willingness for psychiatric treatment, including pharmacotherapy and psychotherapy. Patient is apparently no longer interested in Fluoxetine though notes she was prescribed Escitalopram in the past but did not actually start taking the medication. Patient does not have a clear personal history of bipolar disorder or jose but there is apparently a family history of bipolar disorder. Patient says her plan would be to walk to her children's father's home, where she would be staying alone mostly. She does identify having support from him and her mother.    Review of Systems:    All systems were reviewed and negative except for:  Behavioral/Psych: positive for  depression, hyeprsomnia, and See HPI, decreased energy  Subjective:    Depressed mood, hypersomnia, decreased energy    Objective     Vital Signs:  Temp:  [98.1 °F (36.7 °C)-98.7 °F (37.1 °C)] 98.1 °F (36.7 °C)  Heart Rate:  [73-91] 90  Resp:  [12-16] 14  BP: (104-119)/(49-73) 119/73    Exam:  Completed:  completed within view of staff  Mental Status Exam:     Hygiene:   fair  Cooperation:  Cooperative  Eye Contact:  Fair  Psychomotor Behavior:  Appropriate  Affect:  Restricted  Hopelessness: 5  Speech:  Normal  Goal directed  Thought Content:  Normal  Suicidal:  States she would not care if she were alive or not  Homicidal:  None  Hallucinations:  None  Delusion:  None  Memory:  Intact  Orientation:  Person, Place, Time, and " Situation  Reliability:  fair  Insight:  Some  Judgement:  Improving  Impulse Control:  Improving    Results Review:     I reviewed the patient's new clinical results.    Medication Review: Reviewed    Special Precautions Level:  1:1 sitter at bedside    Other Pertinent Information  Patient provided consent for medical information (including history and treatment) to be discussed with her mother (Meagan Parrish) and children's father (Manish Machuca).     Assessment & Plan     Assessment: No changes  Treatment Plan: Change made to plan  Treatment Plan discussed with: Patient and Family      Tylenol overdose, intentional self-harm, initial encounter    Drug overdose, accidental or unintentional, subsequent encounter    Patient requests discharge against medical advice. At this time, there are no beds available on [facility] for transfer. I have concerns about patient safety given seriousness of recent attempt and her risk factors, so will initiate a 72 hour hold. I have discussed the potential risks/benefits/side effects/alternatives related to treatment with SSRIs and SNRIs. Offer patient Escitalopram for treatment of depression and monitor for response and tolerability. Continued hospitalization can also allow for more robust safety planning, including scheduling of outpatient mental health appointments as patient currently does not have any outpatient follow-up, as well as monitoring of response to Escitalopram given patient has reported a family history of bipolar disorder. Could consider transfer to [facility] tomorrow if a bed is available vs transfer to another facility. I do not anticipate the need for a lengthy hospital stay stay. This plan was discussed with patient's primary team as well as the contacts for whom she provided consent to have discussions about her history and treatment.    I have reviewed and approved the behavioral health treatment plans and problem list. Yes    Plan for disposition  Home when psychiatrically stable    Miguel Jones MD  07/23/23  13:54 EDT
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
## 2023-07-23 NOTE — PLAN OF CARE
Goal Outcome Evaluation:  Plan of Care Reviewed With: patient        Progress: no change  Outcome Evaluation: Pt denies pain/discomfort this shift. Close watch assistant at bedside. Suicide precautions maintained as ordered. No new issues or new needs noted at this time.

## 2023-07-23 NOTE — PROGRESS NOTES
[facility]     Progress Note    Patient Name: Concha Davison  : 1985  MRN: [id number]  Primary Care Physician:  Nelia Gacria PA-C  Date of admission: 2023    Subjective   Subjective     Chief Complaint: Patient stable and doing well not in acute distress she needs to be transferred to the psych unit but the bed is not available at this time hopefully she will be transferred tomorrow    HPI: With depression and suicidal ideation denies any ideation at this time however there is recommendation from psychiatry for transfer to the rehab    Review of Systems   All systems were reviewed and negative except for: Reviewed    Objective   Objective     Vitals:   Temp:  [97.9 °F (36.6 °C)-98.7 °F (37.1 °C)] 98.5 °F (36.9 °C)  Heart Rate:  [73-91] 89  Resp:  [12-18] 12  BP: (104-118)/(49-72) 107/63    Physical Exam    Constitutional: Awake, alert   Eyes: PERRLA, sclerae anicteric, no conjunctival injection   HENT: NCAT, mucous membranes moist   Neck: Supple, no thyromegaly, no lymphadenopathy, trachea midline   Respiratory: Clear to auscultation bilaterally, nonlabored respirations    Cardiovascular: RRR, no murmurs, rubs, or gallops, palpable pedal pulses bilaterally   Gastrointestinal: Positive bowel sounds, soft, nontender, nondistended   Musculoskeletal: No bilateral ankle edema, no clubbing or cyanosis to extremities   Psychiatric: Appropriate affect, cooperative   Neurologic: Oriented x 3, strength symmetric in all extremities, Cranial Nerves grossly intact to confrontation, speech clear   Skin: No rashes   No change  Result Review    Result Review:  I have personally reviewed the results from the time of this admission to 2023 10:16 EDT and agree with these findings:  [x]  Laboratory  []  Microbiology  []  Radiology  []  EKG/Telemetry   []  Cardiology/Vascular   []  Pathology  []  Old records  []  Other:  Most notable findings include: Liver functions are normal    Assessment & Plan    Assessment / Plan     Brief Patient Summary:  Concha Davison is a 38 y.o. female who patient admitted with suicidal ideation with elevated Tylenol levels but functions normal    Active Hospital Problems:  Active Hospital Problems    Diagnosis     **Tylenol overdose, intentional self-harm, initial encounter     Drug overdose, accidental or unintentional, subsequent encounter        Plan:   Continue current management    DVT prophylaxis:  No DVT prophylaxis order currently exists.    CODE STATUS:   Level Of Support Discussed With: Patient  Code Status (Patient has no pulse and is not breathing): CPR (Attempt to Resuscitate)  Medical Interventions (Patient has pulse or is breathing): Full Support  Release to patient: Routine Release    Disposition:  I expect patient to be discharged patient to be transferred to the psych unit.    Electronically signed by Donis Morrissey MD, 07/23/23, 10:16 AM EDT.      Part of this note may be an electronic transcription/translation of spoken language to printed text using the Dragon Dictation System.

## 2023-07-24 NOTE — CASE MANAGEMENT/SOCIAL WORK
Discharge Planning Assessment   [facility]     Patient Name: Concha Davison  MRN: [id number]  Today's Date: 7/24/2023    Admit Date: 7/20/2023    Plan: SW called pt's mother, Federica, to check on pt and provide support if needed. No answer, SW left voicemail with call back details. SW also attempted to call pt, however the number we have on file for her has been disconnected.   Discharge Needs Assessment    No documentation.                  Discharge Plan       Row Name 07/24/23 1506       Plan    Plan SW called pt's mother, Federica, to check on pt and provide support if needed. No answer, SW left voicemail with call back details. SW also attempted to call pt, however the number we have on file for her has been disconnected.                  Continued Care and Services - Discharged on 7/23/2023 Admission date: 7/20/2023 - Discharge disposition: Left Against Medical Advice   Coordination has not been started for this encounter.          Demographic Summary    No documentation.                  Functional Status    No documentation.                  Psychosocial    No documentation.                  Abuse/Neglect    No documentation.                  Legal    No documentation.                  Substance Abuse    No documentation.                  Patient Forms    No documentation.                     ROSENDA Power

## 2023-07-29 NOTE — DISCHARGE SUMMARY
[facility]         DISCHARGE SUMMARY    Patient Name: Concha Davison  : 1985  MRN: [id number]    Date of Admission: 2023  Date of Discharge: 2023  Primary Care Physician: Nelia Garcia PA-C    Consults       Date and Time Order Name Status Description    2023 11:20 AM Inpatient Psychiatrist Consult Completed             Presenting Problem:   Intentional acetaminophen overdose, initial encounter [T39.1X2A]  Tylenol overdose, intentional self-harm, initial encounter [T39.1X2A]  Drug overdose, accidental or unintentional, subsequent encounter [T50.901D]    Active and Resolved Hospital Problems:  Active Hospital Problems    Diagnosis POA    **Tylenol overdose, intentional self-harm, initial encounter [T39.1X2A] Yes    Drug overdose, accidental or unintentional, subsequent encounter [T50.901D] Not Applicable      Resolved Hospital Problems   No resolved problems to display.         Hospital Course     Hospital Course:  Concha Davison is a 38 y.o. female patient was admitted to the hospital because of Tylenol overdose with suicidal intent she was given the treatment with hydration as per recommendations from the drug toxicity protocol patient started doing well Tylenol level liver functions were normal she was seen by a psychiatrist psychiatrist wanted her to go to the [facility] for continual further evaluation patient did mention that she is not anymore suicidal and wanted to go home we told her that recommendation is that the psychiatrist is not very well comfortable about letting her go until she stays for another day and checks it however patient grabbed her purse and walked out the nurses tried to stop her and the security was called but patient had already left ran away and [place] police was notified patient at the time of discharge appeared to be stable and knew what she was doing      DISCHARGE Follow Up Recommendations for labs and diagnostics: Suicidal  intent overdose of Tylenol      Pertinent  and/or Most Recent Results     LAB RESULTS:                              Brief Urine Lab Results  (Last result in the past 365 days)        Color   Clarity   Blood   Leuk Est   Nitrite   Protein   CREAT   Urine HCG        03/06/23 2045 Kira   Turbid   Large   Small (1+)   Positive   300 mg/dL                 Microbiology Results (last 10 days)       ** No results found for the last 240 hours. **                             Labs Pending at Discharge:        Discharge Details        Discharge Medications      Patient Not Prescribed Medications Upon Discharge         No Known Allergies      Discharge Disposition:  Left Against Medical Advice    Diet:  Hospital:No active diet order        Discharge Activity:         CODE STATUS:  Code Status and Medical Interventions:   Ordered at: 07/21/23 1120     Level Of Support Discussed With:    Patient     Code Status (Patient has no pulse and is not breathing):    CPR (Attempt to Resuscitate)     Medical Interventions (Patient has pulse or is breathing):    Full Support     Release to patient:    Routine Release         No future appointments.        Time spent on Discharge including face to face service: 45 minutes    Electronically signed by Donis Morrissey MD, 07/29/23, 11:14 AM EDT.    Part of this note may be an electronic transcription/translation of spoken language to printed text using the Dragon Dictation System.

## 2024-12-02 ENCOUNTER — APPOINTMENT (OUTPATIENT)
Dept: GENERAL RADIOLOGY | Facility: HOSPITAL | Age: 39
End: 2024-12-02
Payer: MEDICAID

## 2024-12-02 ENCOUNTER — HOSPITAL ENCOUNTER (EMERGENCY)
Facility: HOSPITAL | Age: 39
Discharge: HOME | End: 2024-12-02
Attending: EMERGENCY MEDICINE | Admitting: EMERGENCY MEDICINE
Payer: MEDICAID

## 2024-12-02 VITALS
HEART RATE: 84 BPM | SYSTOLIC BLOOD PRESSURE: 143 MMHG | HEIGHT: 69 IN | RESPIRATION RATE: 20 BRPM | DIASTOLIC BLOOD PRESSURE: 95 MMHG | WEIGHT: 195.99 LBS | BODY MASS INDEX: 29.03 KG/M2 | OXYGEN SATURATION: 100 % | TEMPERATURE: 98 F

## 2024-12-02 DIAGNOSIS — M54.12 CERVICAL RADICULOPATHY: Primary | ICD-10-CM

## 2024-12-02 PROCEDURE — 25010000002 KETOROLAC TROMETHAMINE PER 15 MG

## 2024-12-02 PROCEDURE — 25010000002 DEXAMETHASONE SODIUM PHOSPHATE 10 MG/ML SOLUTION

## 2024-12-02 PROCEDURE — 96372 THER/PROPH/DIAG INJ SC/IM: CPT

## 2024-12-02 PROCEDURE — 99283 EMERGENCY DEPT VISIT LOW MDM: CPT

## 2024-12-02 PROCEDURE — 73030 X-RAY EXAM OF SHOULDER: CPT

## 2024-12-02 PROCEDURE — 72050 X-RAY EXAM NECK SPINE 4/5VWS: CPT

## 2024-12-02 RX ORDER — METHOCARBAMOL 500 MG/1
1000 TABLET, FILM COATED ORAL ONCE
Status: COMPLETED | OUTPATIENT
Start: 2024-12-02 | End: 2024-12-02

## 2024-12-02 RX ORDER — LIDOCAINE 4 G/G
1 PATCH TOPICAL
Status: DISCONTINUED | OUTPATIENT
Start: 2024-12-02 | End: 2024-12-02 | Stop reason: HOSPADM

## 2024-12-02 RX ORDER — METHOCARBAMOL 500 MG/1
500 TABLET, FILM COATED ORAL 4 TIMES DAILY PRN
Qty: 40 TABLET | Refills: 0 | Status: SHIPPED | OUTPATIENT
Start: 2024-12-02 | End: 2024-12-12

## 2024-12-02 RX ORDER — DEXAMETHASONE SODIUM PHOSPHATE 10 MG/ML
10 INJECTION, SOLUTION INTRAMUSCULAR; INTRAVENOUS ONCE
Status: COMPLETED | OUTPATIENT
Start: 2024-12-02 | End: 2024-12-02

## 2024-12-02 RX ORDER — KETOROLAC TROMETHAMINE 30 MG/ML
30 INJECTION, SOLUTION INTRAMUSCULAR; INTRAVENOUS ONCE
Status: COMPLETED | OUTPATIENT
Start: 2024-12-02 | End: 2024-12-02

## 2024-12-02 RX ORDER — LIDOCAINE 50 MG/G
1 PATCH TOPICAL EVERY 24 HOURS
Qty: 7 PATCH | Refills: 0 | Status: SHIPPED | OUTPATIENT
Start: 2024-12-02 | End: 2024-12-09

## 2024-12-02 RX ADMIN — KETOROLAC TROMETHAMINE 30 MG: 30 INJECTION, SOLUTION INTRAMUSCULAR; INTRAVENOUS at 19:21

## 2024-12-02 RX ADMIN — LIDOCAINE 1 PATCH: 4 PATCH TOPICAL at 19:21

## 2024-12-02 RX ADMIN — METHOCARBAMOL 1000 MG: 500 TABLET ORAL at 19:21

## 2024-12-02 RX ADMIN — DEXAMETHASONE SODIUM PHOSPHATE 10 MG: 10 INJECTION INTRAMUSCULAR; INTRAVENOUS at 19:21

## 2024-12-02 NOTE — ED PROVIDER NOTES
Time: 4:10 PM EST  Date of encounter:  12/2/2024  Independent Historian/Clinical History and Information was obtained by:   Patient    History is limited by: N/A    Chief Complaint   Patient presents with    Shoulder Pain         History of Present Illness:  Patient is a 39 y.o. year old female who presents to the emergency department for evaluation of left shoulder pain that radiates all the way to the left hand.  Reports numbness and tingling.  Symptoms have been ongoing for the past month but has gotten worse today around 4 AM.  She has done OTC meds, hot bath, and Voltaren gel with minimal relief.  Denies any recent falls or traumas. (Triage Provider: Theo Gordillo PA-C).      Patient Care Team  Primary Care Provider: Nelia Garcia PA-C    Past Medical History:     No Known Allergies  Past Medical History:   Diagnosis Date    Anxiety and depression     Bradycardia     Hyperlipidemia     Hyperthyroidism     Tick bite     LEFT SIDE AT BRA LINE    Vitamin D deficiency      Past Surgical History:   Procedure Laterality Date    SALPINGECTOMY Bilateral     SUPRAPUBIC TUBE REMOVAL       History reviewed. No pertinent family history.    Home Medications:  Prior to Admission medications    Not on File        Social History:   Social History     Tobacco Use    Smoking status: Every Day     Current packs/day: 1.00     Average packs/day: 1 pack/day for 25.0 years (25.0 ttl pk-yrs)     Types: Cigarettes    Smokeless tobacco: Never   Vaping Use    Vaping status: Never Used   Substance Use Topics    Alcohol use: Yes     Comment: RARELY    Drug use: Defer         Review of Systems:  Review of Systems   Constitutional:  Positive for activity change. Negative for appetite change, chills, fatigue and fever.   HENT:  Negative for congestion, ear pain, sneezing and sore throat.    Eyes:  Negative for photophobia and visual disturbance.   Respiratory:  Negative for cough, shortness of breath and wheezing.    Cardiovascular:  Negative  "for chest pain, palpitations and leg swelling.   Gastrointestinal:  Negative for abdominal pain, diarrhea, nausea and vomiting.   Genitourinary:  Negative for difficulty urinating and dysuria.   Musculoskeletal:  Positive for arthralgias, myalgias, neck pain and neck stiffness. Negative for back pain, gait problem and joint swelling.   Skin:  Negative for color change and rash.   Neurological:  Positive for numbness. Negative for dizziness, tremors, seizures, syncope, facial asymmetry, speech difficulty, weakness, light-headedness and headaches.   Hematological:  Does not bruise/bleed easily.   Psychiatric/Behavioral:  Negative for agitation and confusion.         Physical Exam:  /95 (BP Location: Left arm, Patient Position: Sitting)   Pulse 84   Temp 98 °F (36.7 °C) (Oral)   Resp 20   Ht 175.3 cm (69\")   Wt 88.9 kg (195 lb 15.8 oz)   LMP 11/18/2024 (Approximate)   SpO2 100%   BMI 28.94 kg/m²         Physical Exam  Vitals and nursing note reviewed.   Constitutional:       Appearance: Normal appearance.   HENT:      Head: Normocephalic.      Mouth/Throat:      Mouth: Mucous membranes are moist.   Eyes:      Pupils: Pupils are equal, round, and reactive to light.   Cardiovascular:      Rate and Rhythm: Normal rate and regular rhythm.      Pulses: Normal pulses.      Heart sounds: Normal heart sounds. No murmur heard.     No friction rub. No gallop.   Pulmonary:      Effort: Pulmonary effort is normal.      Breath sounds: Normal breath sounds.   Abdominal:      General: There is no distension.      Palpations: Abdomen is soft.   Musculoskeletal:         General: Tenderness present. No deformity or signs of injury.      Cervical back: Neck supple.      Comments: TTP left cervical paraspinal musculature, left trapezius musculature, left upper arm.  CMS intact.   Skin:     General: Skin is warm and dry.      Capillary Refill: Capillary refill takes less than 2 seconds.   Neurological:      General: No focal " deficit present.      Mental Status: She is alert and oriented to person, place, and time.      Cranial Nerves: No cranial nerve deficit.      Sensory: No sensory deficit.   Psychiatric:         Mood and Affect: Mood normal.         Behavior: Behavior normal.                      Procedures:  Procedures      Medical Decision Making:      Comorbidities that affect care:        External Notes reviewed:          The following orders were placed and all results were independently analyzed by me:  Orders Placed This Encounter   Procedures    XR Spine Cervical Complete 4 or 5 View    XR Shoulder 2+ View Left    Ambulatory Referral to Neurosurgery       Medications Given in the Emergency Department:  Medications   Lidocaine 4 % 1 patch (1 patch Transdermal Medication Applied 12/2/24 1921)   dexAMETHasone sodium phosphate injection 10 mg (10 mg Intramuscular Given 12/2/24 1921)   ketorolac (TORADOL) injection 30 mg (30 mg Intramuscular Given 12/2/24 1921)   methocarbamol (ROBAXIN) tablet 1,000 mg (1,000 mg Oral Given 12/2/24 1921)        ED Course:    The patient was initially evaluated in the triage area where orders were placed. The patient was later dispositioned by Juanis Hong PA-C.      The patient was advised to stay for completion of workup which includes but is not limited to communication of labs and radiological results, reassessment and plan. The patient was advised that leaving prior to disposition by a provider could result in critical findings that are not communicated to the patient.     ED Course as of 12/02/24 2059   Mon Dec 02, 2024   1611 PROVIDER IN TRIAGE  Patient was evaluated by Theo velasco PA-C. Orders were placed and awaiting final results and disposition.   [MV]   1803 XR Spine Cervical Complete 4 or 5 View  Mild degenerative changes at C5-C6 [AS]   1803 XR Shoulder 2+ View Left  Normal left shoulder [AS]      ED Course User Index  [AS] Juanis Hong PA-C  [MV] Theo Gordillo PA        Labs:    Lab Results (last 24 hours)       ** No results found for the last 24 hours. **             Imaging:    XR Spine Cervical Complete 4 or 5 View    Result Date: 12/2/2024  XR SPINE CERVICAL COMPLETE 4 OR 5 VW Date of Exam: 12/2/2024 4:33 PM EST Indication: neck pain Comparison: None available. Findings: Vertebral body heights are maintained. Reversal of cervical lordosis, nonspecific but may be positional or due to muscle strain. No definite traumatic malalignment. Mild loss of intervertebral disc base height noted at C5-C6 and C6-C7. The neuroforamina are grossly patent bilaterally. The C1-C2 articulation is maintained. The visualized soft tissues are unremarkable. The visualized lung apices are clear.     Impression: No acute abnormality. Mild degenerative changes at C5-C6 and C6-C7 without significant neuroforaminal narrowing. Electronically Signed: Alfonso Edwards DO  12/2/2024 4:45 PM EST  Workstation ID: [id number]    XR Shoulder 2+ View Left    Result Date: 12/2/2024  XR SHOULDER 2+ VW LEFT Date of Exam: 12/2/2024 4:36 PM EST Indication: left shoulder pain Comparison: None available. Findings: No evidence of acute fracture nor dislocation. Alignment is normal. Joint space is adequately maintained. Soft tissues are unremarkable.     Impression: Normal left shoulder radiographs. Electronically Signed: Donna Aguayo MD  12/2/2024 4:44 PM EST  Workstation ID: [id number]       Differential Diagnosis and Discussion:      Extremity Pain: Differential diagnosis includes but is not limited to soft tissue sprain, tendonitis, tendon injury, dislocation, fracture, deep vein thrombosis, arterial insufficiency, osteoarthritis, bursitis, and ligamentous damage.  Neck Pain: The patient presents with neck pain. My differential diagnosis includes but is not limited to acute spinal epidural abscess, acute spinal epidural bleed, meningitis, musculoskeletal neck pain, spinal fracture, and osteoarthritis.     All  X-rays impressions were independently interpreted by me.    MDM     Amount and/or Complexity of Data Reviewed  Tests in the radiology section of CPT®: ordered and reviewed    Risk of Complications, Morbidity, and/or Mortality  Presenting problems: low  Diagnostic procedures: low  Management options: moderate    Patient Progress  Patient progress: improved                     Patient Care Considerations:          Consultants/Shared Management Plan:    None    Social Determinants of Health:    Patient is independent, reliable, and has access to care.       Disposition and Care Coordination:    Discharged: The patient is suitable and stable for discharge with no need for consideration of admission.    I have explained the patient´s condition, diagnoses and treatment plan based on the information available to me at this time. I have answered questions and addressed any concerns. The patient has a good  understanding of the patient´s diagnosis, condition, and treatment plan as can be expected at this point. The vital signs have been stable. The patient´s condition is stable and appropriate for discharge from the emergency department.      The patient will pursue further outpatient evaluation with the primary care physician or other designated or consulting physician as outlined in the discharge instructions. They are agreeable to this plan of care and follow-up instructions have been explained in detail. The patient has received these instructions in written format and has expressed an understanding of the discharge instructions. The patient is aware that any significant change in condition or worsening of symptoms should prompt an immediate return to this or the closest emergency department or call to 911.  I have explained discharge medications and the need for follow up with the patient/caretakers. This was also printed in the discharge instructions. Patient was discharged with the following medications and follow up:       Medication List        New Prescriptions      lidocaine 5 %  Commonly known as: LIDODERM  Place 1 patch on the skin as directed by provider Daily for 7 days. Remove & Discard patch within 12 hours or as directed by MD     methocarbamol 500 MG tablet  Commonly known as: ROBAXIN  Take 1 tablet by mouth 4 (Four) Times a Day As Needed for Muscle Spasms for up to 10 days.               Where to Get Your Medications        These medications were sent to [facility]/pharmacy #[id number] - [place], [place] - [address] - [phone] [facility] [phone]   [address] [place] [address]      Hours: 24-hours Phone: [phone]   lidocaine 5 %  methocarbamol 500 MG tablet      No follow-up provider specified.     Final diagnoses:   Cervical radiculopathy        ED Disposition       ED Disposition   Discharge    Condition   Stable    Comment   --               This medical record created using voice recognition software.             Juanis Hong PA-C  12/02/24 6050

## 2024-12-03 NOTE — DISCHARGE INSTRUCTIONS
You were evaluated in the emergency department today.  Your exam is consistent with cervical radiculopathy.  Your x-ray shows mild narrowing of C5-C6.  You will likely need to be evaluated by a neurosurgeon, have placed a referral.  I will send muscle relaxers and lidocaine patches to your pharmacy, continue to take alternating Tylenol and ibuprofen as well.  Warm compresses and ice packs can also be helpful.  Return to the emergency department with any new or worsening symptoms.

## 2025-01-15 ENCOUNTER — OFFICE VISIT (OUTPATIENT)
Dept: NEUROSURGERY | Facility: CLINIC | Age: 40
End: 2025-01-15
Payer: MEDICAID

## 2025-01-15 VITALS
HEIGHT: 69 IN | HEART RATE: 61 BPM | DIASTOLIC BLOOD PRESSURE: 78 MMHG | WEIGHT: 194 LBS | SYSTOLIC BLOOD PRESSURE: 123 MMHG | BODY MASS INDEX: 28.73 KG/M2

## 2025-01-15 DIAGNOSIS — R20.0 NUMBNESS AND TINGLING IN LEFT ARM: Primary | ICD-10-CM

## 2025-01-15 DIAGNOSIS — R20.2 POSITIVE TINEL SIGN: ICD-10-CM

## 2025-01-15 DIAGNOSIS — M50.323 DEGENERATION OF INTERVERTEBRAL DISC AT C6-C7 LEVEL: ICD-10-CM

## 2025-01-15 DIAGNOSIS — M79.602 LEFT ARM PAIN: ICD-10-CM

## 2025-01-15 DIAGNOSIS — R29.898 WEAKNESS OF LEFT HAND: ICD-10-CM

## 2025-01-15 DIAGNOSIS — M50.322 DEGENERATION OF C5-C6 INTERVERTEBRAL DISC: ICD-10-CM

## 2025-01-15 DIAGNOSIS — M25.512 LEFT SHOULDER PAIN, UNSPECIFIED CHRONICITY: ICD-10-CM

## 2025-01-15 DIAGNOSIS — R20.2 NUMBNESS AND TINGLING IN LEFT ARM: Primary | ICD-10-CM

## 2025-01-15 NOTE — PROGRESS NOTES
"Chief Complaint  Shoulder Pain (Left ), Tingling (Left up arm, 2-3 digits or left hand ), and Numbness (Elbow to hand of left arm /2-3 digits or left hand )    Subjective          Concha Davison who is a 39 y.o. year old female who presents to [facility] for Evaluation of the Spine.     The patient complains of pain located in the Cervical Spine.  Patients states the pain has been present for 2 months.  The pain came on acutely.  The pain scaled level is 6.  The pain does radiate. Dermatomes are located on left Cervical at: with pain at times to the forearm.  The pain is Intermittent and described as  aching and sometimes like \"my bone on fire\" .  The pain is worse at no particular time of day. Patient states nothing improves the pain.  Patient states  stress or \"aggravated\", \"tense\" makes the pain worse.    Associated Symptoms Include: Numbness and Tingling in left arm especially the 2nd and 3rd digits. She reports weakness in the left hand .  Conservative Interventions Include:  Heat or cold is ineffective, stretching and home exercises for the past 6 weeks were not very effective.  NSAID is ineffective.    Was this the result of an injury or accident? : No    History of Previous Spinal Surgery?: No     reports that she has been smoking cigarettes. She has a 25 pack-year smoking history. She has never used smokeless tobacco.    Review of Systems   Musculoskeletal:  Positive for neck stiffness.   Neurological:  Positive for weakness and numbness.        Objective   Vital Signs:   /78   Pulse 61   Ht 175.3 cm (69\")   Wt 88 kg (194 lb)   BMI 28.65 kg/m²       Physical Exam  Constitutional:       Appearance: Normal appearance.   Neck:      Comments: Worsening with extension  Pulmonary:      Effort: Pulmonary effort is normal.   Musculoskeletal:         General: Tenderness (midline and left cervical area) present.      Comments: Aburto's negative, Tinel's " "negative,  Worse pain in the left upper arm with left shoulder external rotation   Neurological:      General: No focal deficit present.      Mental Status: She is alert and oriented to person, place, and time.      Sensory: Sensory deficit (\"explosive\" sensation in the left upper extremity) present.      Motor: Weakness (left hand ) present.      Deep Tendon Reflexes: Reflexes normal.   Psychiatric:         Mood and Affect: Mood normal.         Behavior: Behavior normal.        Neurological Exam  Mental Status  Alert. Oriented to person, place, and time.      Result Review     I personally interpreted the x-ray of cervical spine from 12/02/2024 which shows mild degenerative changes C5-C6 and C6-C7 without significant foraminal stenosis.     Assessment and Plan    Diagnoses and all orders for this visit:    1. Numbness and tingling in left arm (Primary)  -     MRI Cervical Spine Without Contrast; Future    2. Left shoulder pain, unspecified chronicity    3. Degeneration of C5-C6 intervertebral disc  -     MRI Cervical Spine Without Contrast; Future    4. Degeneration of intervertebral disc at C6-C7 level  -     MRI Cervical Spine Without Contrast; Future    5. Positive Tinel sign  -     EMG & Nerve Conduction Test; Future    6. Weakness of left hand  -     MRI Cervical Spine Without Contrast; Future  -     EMG & Nerve Conduction Test; Future    7. Left arm pain  -     MRI Cervical Spine Without Contrast; Future    She has pain in the left shoulder and left forearm especially.    She has numbness/tingling to the left 2nd and 3rd digits especially.    She has degenerative change most notable on x-ray at C5-C6 and C6-C7.    She has been doing home exercises for the past 6 weeks have been ineffective in producing any long-term benefit.    Considering the radicular complaints, the changes on x-ray, and the failure of conservative treatment, I will order an MRI of the cervical spine without contrast to evaluate for " stenosis in the cervical spine which could contribute to nerve impingement and radicular complaints in the left arm.    She did have positive tinel's test at the left wrist, which may be suggestive of median nerve neuropathy. I will order an NCV/EMG test of the left upper extremity to assess for median nerve neuropathy vs cervical radiculopathy.    She will follow-up after imaging.      Follow Up   Return in about 4 weeks (around 2/12/2025).  Patient was given instructions and counseling regarding her condition or for health maintenance advice. Please see specific information pulled into the AVS if appropriate.

## 2025-01-23 ENCOUNTER — PATIENT ROUNDING (BHMG ONLY) (OUTPATIENT)
Dept: NEUROSURGERY | Facility: CLINIC | Age: 40
End: 2025-01-23
Payer: MEDICAID

## 2025-03-06 ENCOUNTER — HOSPITAL ENCOUNTER (OUTPATIENT)
Dept: MRI IMAGING | Facility: HOSPITAL | Age: 40
Discharge: HOME | End: 2025-03-06
Admitting: PHYSICIAN ASSISTANT
Payer: MEDICAID

## 2025-03-06 DIAGNOSIS — M50.322 DEGENERATION OF C5-C6 INTERVERTEBRAL DISC: ICD-10-CM

## 2025-03-06 DIAGNOSIS — R29.898 WEAKNESS OF LEFT HAND: ICD-10-CM

## 2025-03-06 DIAGNOSIS — R20.2 NUMBNESS AND TINGLING IN LEFT ARM: ICD-10-CM

## 2025-03-06 DIAGNOSIS — M50.323 DEGENERATION OF INTERVERTEBRAL DISC AT C6-C7 LEVEL: ICD-10-CM

## 2025-03-06 DIAGNOSIS — M79.602 LEFT ARM PAIN: ICD-10-CM

## 2025-03-06 DIAGNOSIS — R20.0 NUMBNESS AND TINGLING IN LEFT ARM: ICD-10-CM

## 2025-03-06 PROCEDURE — 72141 MRI NECK SPINE W/O DYE: CPT

## 2025-03-12 ENCOUNTER — OFFICE VISIT (OUTPATIENT)
Dept: NEUROSURGERY | Facility: CLINIC | Age: 40
End: 2025-03-12
Payer: MEDICAID

## 2025-03-12 VITALS
BODY MASS INDEX: 28.58 KG/M2 | DIASTOLIC BLOOD PRESSURE: 74 MMHG | WEIGHT: 193 LBS | HEART RATE: 88 BPM | HEIGHT: 69 IN | SYSTOLIC BLOOD PRESSURE: 117 MMHG

## 2025-03-12 DIAGNOSIS — M50.222 HERNIATED NUCLEUS PULPOSUS, C5-6 LEFT: Primary | ICD-10-CM

## 2025-03-12 DIAGNOSIS — M50.223 HERNIATED NUCLEUS PULPOSUS, C6-7 LEFT: ICD-10-CM

## 2025-03-12 DIAGNOSIS — R20.0 NUMBNESS AND TINGLING IN LEFT ARM: ICD-10-CM

## 2025-03-12 DIAGNOSIS — R20.2 NUMBNESS AND TINGLING IN LEFT ARM: ICD-10-CM

## 2025-03-12 DIAGNOSIS — M48.02 FORAMINAL STENOSIS OF CERVICAL REGION: ICD-10-CM

## 2025-03-12 DIAGNOSIS — R29.898 WEAKNESS OF LEFT HAND: ICD-10-CM

## 2025-03-12 DIAGNOSIS — M79.602 LEFT ARM PAIN: ICD-10-CM

## 2025-03-12 NOTE — PROGRESS NOTES
Patient being seen for today for Follow-up (MRI Cervical Spine Without Contrast completed on 3/16/2025, EMG & Nerve Conduction Test not scheduled until after f/u to go over imaging. /)  .    Subjective    Concha Davison is a 39 y.o. female that presents with Follow-up (MRI Cervical Spine Without Contrast completed on 3/16/2025, EMG & Nerve Conduction Test not scheduled until after f/u to go over imaging. /)  .    HPI  Previously: Last seen on 1/15/2025 for pain in the left shoulder and left forearm especially with numbness and tingling to the left second and third digits.  There is degenerative change notable on x-ray at C5-C6 and C6-C7.  She had done 6 weeks of home exercises which was ineffective.  There was an order for MRI of the cervical spine to evaluate further.  She had positive Tinel's testing at the left wrist and there was NCV/EMG test ordered of the left upper extremity to assess for median nerve neuropathy versus cervical radiculopathy.    Today: She reports her pain and numbness/tingling is the same today. Denies new complaints. She did complete MRI, but deferred Ncv/EMG testing.     reports that she has been smoking cigarettes. She has a 25 pack-year smoking history. She has never used smokeless tobacco.    Review of Systems   Musculoskeletal:  Positive for arthralgias (left shoulder).        Left arm pain to forearm   Neurological:  Positive for weakness and numbness.       Objective   Vitals:    03/12/25 0839   BP: 117/74   Pulse: 88        Physical Exam  Constitutional:       Appearance: Normal appearance.   Pulmonary:      Effort: Pulmonary effort is normal.   Musculoskeletal:         General: Tenderness (left trapezius area) present.      Cervical back: Normal range of motion.      Comments: Aburto's negative bilaterally,  Tinel's test negative at left wrist today   Neurological:      General: No focal deficit present.      Mental Status: She is alert and oriented to person, place, and time.       Sensory: No sensory deficit.      Motor: No weakness.      Deep Tendon Reflexes: Reflexes normal.   Psychiatric:         Mood and Affect: Mood normal.         Behavior: Behavior normal.          Result Review   I have personally interpreted the MRI of cervical spine without contrast from 3/6/2025 which shows severe left foraminal narrowing at C5-C6 and C6-C7.     Assessment and Plan {CC Problem List  Visit Diagnosis  ROS  Review (Popup)  [facility] Maintenance  Quality  BestPractice  Medications  SmartSets  SnapShot Encounters  Media :23}   Diagnoses and all orders for this visit:    1. Herniated nucleus pulposus, C5-6 left (Primary)    2. Herniated nucleus pulposus, C6-7 left    3. Foraminal stenosis of cervical region    4. Left arm pain    5. Numbness and tingling in left arm    6. Weakness of left hand    She has continued pain in the neck, left shoulder and arm to the forearm with numbness and tingling in the left hand especially to the 2nd and 3rd digits. Her complaints are stable today.    The MRI does show severe foraminal stenosis on the left at both the C5-C6 and C6-C7 level.    With her symptoms in a mixed C6 and C7 pattern, I expect this could explain her complaints.    She may benefit from a surgical approach at C5-C6 and C6-C7. Overall, this is less likely to be helpful when pain does not extend to the 1st and 2nd digit and the middle 3 digits.    I had planned to have her complete NCV/EMG testing of the left arm due to weakness and positive tinel's testing at the wrist. Tinel's testing was negative on exam today, and I did not appreciate weakness on examination - so she will defer Ncv/EMG at this time.    She would like to consider a possible surgical approach and I will schedule her to discuss with Dr. Payne.  Follow Up {Instructions Charge Capture  Follow-up Communications :23}   Return for Next available Thursday to discuss surgery with Dr. Payne..

## 2025-03-27 ENCOUNTER — OFFICE VISIT (OUTPATIENT)
Dept: NEUROSURGERY | Facility: CLINIC | Age: 40
End: 2025-03-27
Payer: MEDICAID

## 2025-03-27 VITALS
HEART RATE: 97 BPM | DIASTOLIC BLOOD PRESSURE: 74 MMHG | WEIGHT: 193 LBS | HEIGHT: 69 IN | SYSTOLIC BLOOD PRESSURE: 118 MMHG | OXYGEN SATURATION: 100 % | BODY MASS INDEX: 28.58 KG/M2

## 2025-03-27 DIAGNOSIS — M48.02 FORAMINAL STENOSIS OF CERVICAL REGION: ICD-10-CM

## 2025-03-27 DIAGNOSIS — R20.0 NUMBNESS AND TINGLING IN LEFT ARM: ICD-10-CM

## 2025-03-27 DIAGNOSIS — M50.222 HERNIATED NUCLEUS PULPOSUS, C5-6 LEFT: Primary | ICD-10-CM

## 2025-03-27 DIAGNOSIS — R20.2 NUMBNESS AND TINGLING IN LEFT ARM: ICD-10-CM

## 2025-03-27 DIAGNOSIS — M50.223 HERNIATED NUCLEUS PULPOSUS, C6-7 LEFT: ICD-10-CM

## 2025-03-27 DIAGNOSIS — M79.602 LEFT ARM PAIN: ICD-10-CM

## 2025-03-27 DIAGNOSIS — R29.898 WEAKNESS OF LEFT HAND: ICD-10-CM

## 2025-03-27 DIAGNOSIS — F17.210 CIGARETTE NICOTINE DEPENDENCE WITHOUT COMPLICATION: ICD-10-CM

## 2025-03-27 NOTE — PROGRESS NOTES
Patient being seen for today for Follow-up  .    Subjective    Concha Davison is a 39 y.o. female that presents with Follow-up  .    HPI  Previously: Last seen on 3/12/2025 for complaints of neck pain, left shoulder pain and arm pain to the forearm with numbness and tingling in the left hand, especially the second and third digits.  MRI showed severe foraminal stenosis on the left at both C5-C6 and C6-C7.  There is plan to follow-up to discuss possible surgical approach with Dr. Payne.    Today: She reports continued pain in the left arm to the forearm with numbness to the 2nd and 3rd digits. Denies new complaints.     reports that she has been smoking cigarettes. She has a 25 pack-year smoking history. She has never used smokeless tobacco.    Review of Systems   Musculoskeletal:  Positive for neck pain.        Left arm pain to forearm   Neurological:  Positive for weakness and numbness.       Objective   Vitals:    03/27/25 1100   BP: 118/74   Pulse: 97   SpO2: 100%        Physical Exam  Constitutional:       Appearance: Normal appearance.   Pulmonary:      Effort: Pulmonary effort is normal.   Musculoskeletal:         General: Tenderness (left cervical area) present.      Cervical back: Normal range of motion.      Comments: Aburto's negative bilaterally,  Tinel's test negative at left wrist and elbow today   Neurological:      General: No focal deficit present.      Mental Status: She is alert and oriented to person, place, and time.      Sensory: No sensory deficit.      Motor: Weakness (left hand ) present.      Deep Tendon Reflexes: Reflexes normal.   Psychiatric:         Mood and Affect: Mood normal.         Behavior: Behavior normal.          Result Review   I have personally interpreted the MRI of cervical spine without contrast from 3/6/2025 which shows severe left foraminal narrowing at C5-C6 and C6-C7.     Assessment and Plan {CC Problem List  Visit Diagnosis  ROS  Review (Popup)  Health  [facility]  BestPractice  Medications  [facility]  SnapShot Encounters  Media :23}   Diagnoses and all orders for this visit:    1. Herniated nucleus pulposus, C5-6 left (Primary)  -     Case Request; Standing  -     Follow Anesthesia Guidelines / Protocol; Future  -     Follow Anesthesia Guidelines / Protocol; Standing  -     SCD (sequential compression device)- to be placed on patient in Pre-op; Standing  -     Verify / Perform Chlorhexidine Skin Prep; Standing  -     ceFAZolin (ANCEF) 2 g in sodium chloride 0.9 % 100 mL IVPB  -     Case Request  -     Nicotine & Metabolite, Quant; Future    2. Herniated nucleus pulposus, C6-7 left  -     Case Request; Standing  -     Follow Anesthesia Guidelines / Protocol; Future  -     Follow Anesthesia Guidelines / Protocol; Standing  -     SCD (sequential compression device)- to be placed on patient in Pre-op; Standing  -     Verify / Perform Chlorhexidine Skin Prep; Standing  -     ceFAZolin (ANCEF) 2 g in sodium chloride 0.9 % 100 mL IVPB  -     Case Request  -     Nicotine & Metabolite, Quant; Future    3. Foraminal stenosis of cervical region    4. Left arm pain    5. Numbness and tingling in left arm    6. Weakness of left hand    7. Cigarette nicotine dependence without complication  -     Nicotine & Metabolite, Quant; Future    She has continued left arm pain to the forearm with numbness to the 2nd and 3rd digit of the left hand.    She has severe stenosis on the left at C5-C6 and C6-C7.    We discussed the importance of smoking/nicotine cessation. Smoking/nicotine use has multiple health risks. In particular related to the spine, nicotine increases the incidence of lower back pain, speeds up the progression of degenerative disc disease and dramatically reduces healing after spine surgery (particularly a fusion operation).    She did discuss possible ACDF at these levels with Dr. Payne to target her left arm pain including the risks, benefits and  alternatives. She would like to pursue surgery and will be scheduled for right approach ACDF at C5-C6 and C6-C7.    Follow Up {Instructions Charge Capture  Follow-up Communications :23}   Return for Surgery and postop.     She would like to pursue right approach for C5-6 and C6-7 ACDF. She would need a negative nicotine test.